# Patient Record
Sex: FEMALE | Race: WHITE | Employment: UNEMPLOYED | URBAN - METROPOLITAN AREA
[De-identification: names, ages, dates, MRNs, and addresses within clinical notes are randomized per-mention and may not be internally consistent; named-entity substitution may affect disease eponyms.]

---

## 2024-03-26 ENCOUNTER — APPOINTMENT (OUTPATIENT)
Dept: RADIOLOGY | Facility: HOSPITAL | Age: 74
End: 2024-03-26
Payer: COMMERCIAL

## 2024-03-26 ENCOUNTER — HOSPITAL ENCOUNTER (INPATIENT)
Facility: HOSPITAL | Age: 74
LOS: 1 days | Discharge: HOME | End: 2024-03-27
Attending: STUDENT IN AN ORGANIZED HEALTH CARE EDUCATION/TRAINING PROGRAM | Admitting: STUDENT IN AN ORGANIZED HEALTH CARE EDUCATION/TRAINING PROGRAM
Payer: COMMERCIAL

## 2024-03-26 DIAGNOSIS — R74.8 ELEVATED LIVER ENZYMES: Primary | ICD-10-CM

## 2024-03-26 DIAGNOSIS — K86.2 PANCREATIC CYST (HHS-HCC): ICD-10-CM

## 2024-03-26 LAB
AFP SERPL-MCNC: 5 NG/ML (ref 0–9)
ALBUMIN SERPL BCP-MCNC: 4 G/DL (ref 3.4–5)
ALP SERPL-CCNC: 172 U/L (ref 33–136)
ALT SERPL W P-5'-P-CCNC: 432 U/L (ref 7–45)
ANION GAP SERPL CALC-SCNC: 18 MMOL/L (ref 10–20)
AST SERPL W P-5'-P-CCNC: 132 U/L (ref 9–39)
BASOPHILS # BLD AUTO: 0.04 X10*3/UL (ref 0–0.1)
BASOPHILS NFR BLD AUTO: 0.7 %
BILIRUB DIRECT SERPL-MCNC: 0.3 MG/DL (ref 0–0.3)
BILIRUB SERPL-MCNC: 1 MG/DL (ref 0–1.2)
BUN SERPL-MCNC: 13 MG/DL (ref 6–23)
CALCIUM SERPL-MCNC: 9.5 MG/DL (ref 8.6–10.6)
CANCER AG19-9 SERPL-ACNC: 15.44 U/ML
CEA SERPL-MCNC: <0.5 UG/L
CERULOPLASMIN SERPL-MCNC: 31.5 MG/DL (ref 20–60)
CHLORIDE SERPL-SCNC: 107 MMOL/L (ref 98–107)
CO2 SERPL-SCNC: 20 MMOL/L (ref 21–32)
CREAT SERPL-MCNC: 0.59 MG/DL (ref 0.5–1.05)
EGFRCR SERPLBLD CKD-EPI 2021: >90 ML/MIN/1.73M*2
EOSINOPHIL # BLD AUTO: 0.09 X10*3/UL (ref 0–0.4)
EOSINOPHIL NFR BLD AUTO: 1.6 %
ERYTHROCYTE [DISTWIDTH] IN BLOOD BY AUTOMATED COUNT: 13.7 % (ref 11.5–14.5)
EST. AVERAGE GLUCOSE BLD GHB EST-MCNC: 103 MG/DL
GLUCOSE BLD MANUAL STRIP-MCNC: 79 MG/DL (ref 74–99)
GLUCOSE SERPL-MCNC: 60 MG/DL (ref 74–99)
HAV IGM SER QL: NONREACTIVE
HBA1C MFR BLD: 5.2 %
HBV CORE IGM SER QL: NONREACTIVE
HBV SURFACE AG SERPL QL IA: NONREACTIVE
HCT VFR BLD AUTO: 33.6 % (ref 36–46)
HCV AB SER QL: NONREACTIVE
HGB BLD-MCNC: 11 G/DL (ref 12–16)
IMM GRANULOCYTES # BLD AUTO: 0.02 X10*3/UL (ref 0–0.5)
IMM GRANULOCYTES NFR BLD AUTO: 0.4 % (ref 0–0.9)
LYMPHOCYTES # BLD AUTO: 0.76 X10*3/UL (ref 0.8–3)
LYMPHOCYTES NFR BLD AUTO: 13.4 %
MAGNESIUM SERPL-MCNC: 1.98 MG/DL (ref 1.6–2.4)
MCH RBC QN AUTO: 27.4 PG (ref 26–34)
MCHC RBC AUTO-ENTMCNC: 32.7 G/DL (ref 32–36)
MCV RBC AUTO: 84 FL (ref 80–100)
MONOCYTES # BLD AUTO: 0.75 X10*3/UL (ref 0.05–0.8)
MONOCYTES NFR BLD AUTO: 13.3 %
NEUTROPHILS # BLD AUTO: 4 X10*3/UL (ref 1.6–5.5)
NEUTROPHILS NFR BLD AUTO: 70.6 %
NRBC BLD-RTO: 0 /100 WBCS (ref 0–0)
PLATELET # BLD AUTO: 194 X10*3/UL (ref 150–450)
POTASSIUM SERPL-SCNC: 4.1 MMOL/L (ref 3.5–5.3)
PROT SERPL-MCNC: 6.5 G/DL (ref 6.4–8.2)
RBC # BLD AUTO: 4.01 X10*6/UL (ref 4–5.2)
SODIUM SERPL-SCNC: 141 MMOL/L (ref 136–145)
WBC # BLD AUTO: 5.7 X10*3/UL (ref 4.4–11.3)

## 2024-03-26 PROCEDURE — 80074 ACUTE HEPATITIS PANEL: CPT

## 2024-03-26 PROCEDURE — 86038 ANTINUCLEAR ANTIBODIES: CPT | Performed by: STUDENT IN AN ORGANIZED HEALTH CARE EDUCATION/TRAINING PROGRAM

## 2024-03-26 PROCEDURE — 2500000001 HC RX 250 WO HCPCS SELF ADMINISTERED DRUGS (ALT 637 FOR MEDICARE OP)

## 2024-03-26 PROCEDURE — 74183 MRI ABD W/O CNTR FLWD CNTR: CPT

## 2024-03-26 PROCEDURE — 82104 ALPHA-1-ANTITRYPSIN PHENO: CPT | Performed by: STUDENT IN AN ORGANIZED HEALTH CARE EDUCATION/TRAINING PROGRAM

## 2024-03-26 PROCEDURE — A9575 INJ GADOTERATE MEGLUMI 0.1ML: HCPCS | Performed by: STUDENT IN AN ORGANIZED HEALTH CARE EDUCATION/TRAINING PROGRAM

## 2024-03-26 PROCEDURE — 86381 MITOCHONDRIAL ANTIBODY EACH: CPT | Performed by: STUDENT IN AN ORGANIZED HEALTH CARE EDUCATION/TRAINING PROGRAM

## 2024-03-26 PROCEDURE — 86301 IMMUNOASSAY TUMOR CA 19-9: CPT | Performed by: STUDENT IN AN ORGANIZED HEALTH CARE EDUCATION/TRAINING PROGRAM

## 2024-03-26 PROCEDURE — 2550000001 HC RX 255 CONTRASTS: Performed by: STUDENT IN AN ORGANIZED HEALTH CARE EDUCATION/TRAINING PROGRAM

## 2024-03-26 PROCEDURE — 86015 ACTIN ANTIBODY EACH: CPT | Performed by: STUDENT IN AN ORGANIZED HEALTH CARE EDUCATION/TRAINING PROGRAM

## 2024-03-26 PROCEDURE — 36415 COLL VENOUS BLD VENIPUNCTURE: CPT

## 2024-03-26 PROCEDURE — 82378 CARCINOEMBRYONIC ANTIGEN: CPT | Performed by: STUDENT IN AN ORGANIZED HEALTH CARE EDUCATION/TRAINING PROGRAM

## 2024-03-26 PROCEDURE — 80053 COMPREHEN METABOLIC PANEL: CPT

## 2024-03-26 PROCEDURE — 74183 MRI ABD W/O CNTR FLWD CNTR: CPT | Performed by: RADIOLOGY

## 2024-03-26 PROCEDURE — 82248 BILIRUBIN DIRECT: CPT

## 2024-03-26 PROCEDURE — 76376 3D RENDER W/INTRP POSTPROCES: CPT | Performed by: RADIOLOGY

## 2024-03-26 PROCEDURE — 83036 HEMOGLOBIN GLYCOSYLATED A1C: CPT

## 2024-03-26 PROCEDURE — 85025 COMPLETE CBC W/AUTO DIFF WBC: CPT

## 2024-03-26 PROCEDURE — 82947 ASSAY GLUCOSE BLOOD QUANT: CPT

## 2024-03-26 PROCEDURE — 82390 ASSAY OF CERULOPLASMIN: CPT | Performed by: STUDENT IN AN ORGANIZED HEALTH CARE EDUCATION/TRAINING PROGRAM

## 2024-03-26 PROCEDURE — 83735 ASSAY OF MAGNESIUM: CPT

## 2024-03-26 PROCEDURE — 1210000001 HC SEMI-PRIVATE ROOM DAILY

## 2024-03-26 PROCEDURE — 82105 ALPHA-FETOPROTEIN SERUM: CPT | Performed by: STUDENT IN AN ORGANIZED HEALTH CARE EDUCATION/TRAINING PROGRAM

## 2024-03-26 PROCEDURE — 99222 1ST HOSP IP/OBS MODERATE 55: CPT

## 2024-03-26 RX ORDER — METHYLPHENIDATE HYDROCHLORIDE 5 MG/1
20 TABLET ORAL 3 TIMES DAILY
Status: CANCELLED | OUTPATIENT
Start: 2024-03-26

## 2024-03-26 RX ORDER — DEXTROSE 50 % IN WATER (D50W) INTRAVENOUS SYRINGE
25
Status: DISCONTINUED | OUTPATIENT
Start: 2024-03-26 | End: 2024-03-27 | Stop reason: HOSPADM

## 2024-03-26 RX ORDER — GADOTERATE MEGLUMINE 376.9 MG/ML
11 INJECTION INTRAVENOUS
Status: COMPLETED | OUTPATIENT
Start: 2024-03-26 | End: 2024-03-26

## 2024-03-26 RX ORDER — FAMOTIDINE 20 MG/1
20 TABLET, FILM COATED ORAL DAILY
Status: DISCONTINUED | OUTPATIENT
Start: 2024-03-26 | End: 2024-03-27 | Stop reason: HOSPADM

## 2024-03-26 RX ORDER — METHYLPHENIDATE HYDROCHLORIDE 5 MG/1
20 TABLET ORAL 2 TIMES DAILY
Status: DISCONTINUED | OUTPATIENT
Start: 2024-03-26 | End: 2024-03-26

## 2024-03-26 RX ORDER — HYDROCODONE BITARTRATE AND ACETAMINOPHEN 7.5; 325 MG/1; MG/1
1 TABLET ORAL 4 TIMES DAILY
COMMUNITY

## 2024-03-26 RX ORDER — LORATADINE 10 MG/1
10 TABLET ORAL DAILY
COMMUNITY

## 2024-03-26 RX ORDER — LISINOPRIL 10 MG/1
10 TABLET ORAL DAILY
Status: DISCONTINUED | OUTPATIENT
Start: 2024-03-26 | End: 2024-03-27 | Stop reason: HOSPADM

## 2024-03-26 RX ORDER — OXYCODONE HYDROCHLORIDE 5 MG/1
5 TABLET ORAL EVERY 6 HOURS PRN
Status: CANCELLED | OUTPATIENT
Start: 2024-03-26

## 2024-03-26 RX ORDER — METAXALONE 800 MG/1
800 TABLET ORAL 3 TIMES DAILY PRN
COMMUNITY

## 2024-03-26 RX ORDER — OXYCODONE HYDROCHLORIDE 5 MG/1
5 TABLET ORAL EVERY 6 HOURS PRN
Status: DISCONTINUED | OUTPATIENT
Start: 2024-03-26 | End: 2024-03-27 | Stop reason: HOSPADM

## 2024-03-26 RX ORDER — GLUCOSAMINE/CHONDRO SU A 500-400 MG
1 TABLET ORAL DAILY
COMMUNITY

## 2024-03-26 RX ORDER — ACETAMINOPHEN 325 MG/1
650 TABLET ORAL EVERY 4 HOURS PRN
Status: DISCONTINUED | OUTPATIENT
Start: 2024-03-26 | End: 2024-03-27 | Stop reason: HOSPADM

## 2024-03-26 RX ORDER — ATORVASTATIN CALCIUM 20 MG/1
20 TABLET, FILM COATED ORAL NIGHTLY
Status: CANCELLED | OUTPATIENT
Start: 2024-03-26

## 2024-03-26 RX ORDER — ACETAMINOPHEN 650 MG/1
650 SUPPOSITORY RECTAL EVERY 4 HOURS PRN
Status: DISCONTINUED | OUTPATIENT
Start: 2024-03-26 | End: 2024-03-27 | Stop reason: HOSPADM

## 2024-03-26 RX ORDER — METHYLPHENIDATE HYDROCHLORIDE 5 MG/1
20 TABLET ORAL
Status: DISCONTINUED | OUTPATIENT
Start: 2024-03-26 | End: 2024-03-26

## 2024-03-26 RX ORDER — LORATADINE 10 MG/1
10 TABLET ORAL DAILY
Status: DISCONTINUED | OUTPATIENT
Start: 2024-03-26 | End: 2024-03-27 | Stop reason: HOSPADM

## 2024-03-26 RX ORDER — LISINOPRIL 10 MG/1
10 TABLET ORAL DAILY
Status: CANCELLED | OUTPATIENT
Start: 2024-03-26

## 2024-03-26 RX ORDER — ACETAMINOPHEN 160 MG/5ML
650 SOLUTION ORAL EVERY 4 HOURS PRN
Status: DISCONTINUED | OUTPATIENT
Start: 2024-03-26 | End: 2024-03-27 | Stop reason: HOSPADM

## 2024-03-26 RX ORDER — METAXALONE 800 MG/1
800 TABLET ORAL 3 TIMES DAILY PRN
Status: DISCONTINUED | OUTPATIENT
Start: 2024-03-26 | End: 2024-03-27 | Stop reason: HOSPADM

## 2024-03-26 RX ORDER — SIMETHICONE 180 MG
180 CAPSULE ORAL EVERY 6 HOURS PRN
COMMUNITY

## 2024-03-26 RX ORDER — SENNOSIDES 8.6 MG/1
2 TABLET ORAL 2 TIMES DAILY
Status: DISCONTINUED | OUTPATIENT
Start: 2024-03-26 | End: 2024-03-27 | Stop reason: HOSPADM

## 2024-03-26 RX ORDER — FAMOTIDINE 20 MG/1
20 TABLET, FILM COATED ORAL DAILY
COMMUNITY

## 2024-03-26 RX ORDER — METHYLPHENIDATE HYDROCHLORIDE 20 MG/1
20 TABLET ORAL
COMMUNITY

## 2024-03-26 RX ORDER — DEXTROSE 50 % IN WATER (D50W) INTRAVENOUS SYRINGE
12.5
Status: DISCONTINUED | OUTPATIENT
Start: 2024-03-26 | End: 2024-03-27 | Stop reason: HOSPADM

## 2024-03-26 RX ORDER — LISINOPRIL 10 MG/1
10 TABLET ORAL DAILY
COMMUNITY

## 2024-03-26 RX ORDER — ZOLPIDEM TARTRATE 5 MG/1
5 TABLET ORAL NIGHTLY PRN
COMMUNITY

## 2024-03-26 RX ORDER — METHYLPHENIDATE HYDROCHLORIDE 5 MG/1
20 TABLET ORAL 2 TIMES DAILY
Status: DISCONTINUED | OUTPATIENT
Start: 2024-03-27 | End: 2024-03-27 | Stop reason: HOSPADM

## 2024-03-26 RX ORDER — SIMETHICONE 80 MG
40 TABLET,CHEWABLE ORAL 4 TIMES DAILY PRN
Status: DISCONTINUED | OUTPATIENT
Start: 2024-03-26 | End: 2024-03-27 | Stop reason: HOSPADM

## 2024-03-26 RX ORDER — ZOLPIDEM TARTRATE 5 MG/1
5 TABLET ORAL NIGHTLY PRN
Status: DISCONTINUED | OUTPATIENT
Start: 2024-03-26 | End: 2024-03-27 | Stop reason: HOSPADM

## 2024-03-26 RX ORDER — ZOLPIDEM TARTRATE 5 MG/1
5 TABLET ORAL NIGHTLY PRN
Status: CANCELLED | OUTPATIENT
Start: 2024-03-26

## 2024-03-26 RX ADMIN — OXYCODONE HYDROCHLORIDE 5 MG: 5 TABLET ORAL at 21:17

## 2024-03-26 RX ADMIN — OXYCODONE HYDROCHLORIDE 5 MG: 5 TABLET ORAL at 14:07

## 2024-03-26 RX ADMIN — SIMETHICONE 40 MG: 80 TABLET, CHEWABLE ORAL at 21:17

## 2024-03-26 RX ADMIN — SENNOSIDES 17.2 MG: 8.6 TABLET, FILM COATED ORAL at 11:53

## 2024-03-26 RX ADMIN — GADOTERATE MEGLUMINE 11 ML: 376.9 INJECTION INTRAVENOUS at 17:42

## 2024-03-26 SDOH — SOCIAL STABILITY: SOCIAL INSECURITY: DO YOU FEEL UNSAFE GOING BACK TO THE PLACE WHERE YOU ARE LIVING?: NO

## 2024-03-26 SDOH — SOCIAL STABILITY: SOCIAL INSECURITY: WERE YOU ABLE TO COMPLETE ALL THE BEHAVIORAL HEALTH SCREENINGS?: YES

## 2024-03-26 SDOH — SOCIAL STABILITY: SOCIAL INSECURITY: HAVE YOU HAD THOUGHTS OF HARMING ANYONE ELSE?: NO

## 2024-03-26 SDOH — SOCIAL STABILITY: SOCIAL INSECURITY: DO YOU FEEL ANYONE HAS EXPLOITED OR TAKEN ADVANTAGE OF YOU FINANCIALLY OR OF YOUR PERSONAL PROPERTY?: NO

## 2024-03-26 SDOH — SOCIAL STABILITY: SOCIAL INSECURITY: ARE THERE ANY APPARENT SIGNS OF INJURIES/BEHAVIORS THAT COULD BE RELATED TO ABUSE/NEGLECT?: NO

## 2024-03-26 SDOH — SOCIAL STABILITY: SOCIAL INSECURITY: HAS ANYONE EVER THREATENED TO HURT YOUR FAMILY OR YOUR PETS?: NO

## 2024-03-26 SDOH — SOCIAL STABILITY: SOCIAL INSECURITY: DOES ANYONE TRY TO KEEP YOU FROM HAVING/CONTACTING OTHER FRIENDS OR DOING THINGS OUTSIDE YOUR HOME?: NO

## 2024-03-26 SDOH — SOCIAL STABILITY: SOCIAL INSECURITY: ARE YOU OR HAVE YOU BEEN THREATENED OR ABUSED PHYSICALLY, EMOTIONALLY, OR SEXUALLY BY ANYONE?: NO

## 2024-03-26 SDOH — SOCIAL STABILITY: SOCIAL INSECURITY: ABUSE: ADULT

## 2024-03-26 ASSESSMENT — ACTIVITIES OF DAILY LIVING (ADL)
FEEDING YOURSELF: INDEPENDENT
BATHING: INDEPENDENT
PATIENT'S MEMORY ADEQUATE TO SAFELY COMPLETE DAILY ACTIVITIES?: YES
LACK_OF_TRANSPORTATION: NO
TOILETING: INDEPENDENT
JUDGMENT_ADEQUATE_SAFELY_COMPLETE_DAILY_ACTIVITIES: YES
HEARING - RIGHT EAR: FUNCTIONAL
ADEQUATE_TO_COMPLETE_ADL: YES
GROOMING: INDEPENDENT
HEARING - LEFT EAR: FUNCTIONAL
WALKS IN HOME: INDEPENDENT
DRESSING YOURSELF: INDEPENDENT

## 2024-03-26 ASSESSMENT — COGNITIVE AND FUNCTIONAL STATUS - GENERAL
PATIENT BASELINE BEDBOUND: NO
WALKING IN HOSPITAL ROOM: A LITTLE
MOBILITY SCORE: 22
DAILY ACTIVITIY SCORE: 24
CLIMB 3 TO 5 STEPS WITH RAILING: A LITTLE

## 2024-03-26 ASSESSMENT — ENCOUNTER SYMPTOMS
NAUSEA: 1
EYES NEGATIVE: 1
DIARRHEA: 0
APPETITE CHANGE: 0
MUSCULOSKELETAL NEGATIVE: 1
BLOOD IN STOOL: 0
CARDIOVASCULAR NEGATIVE: 1
RESPIRATORY NEGATIVE: 1
PSYCHIATRIC NEGATIVE: 1
ALLERGIC/IMMUNOLOGIC NEGATIVE: 1
CHILLS: 0
HEMATOLOGIC/LYMPHATIC NEGATIVE: 1
FATIGUE: 1
ENDOCRINE NEGATIVE: 1
VOMITING: 1
WEAKNESS: 1
DIAPHORESIS: 1
UNEXPECTED WEIGHT CHANGE: 0

## 2024-03-26 ASSESSMENT — PAIN SCALES - GENERAL
PAINLEVEL_OUTOF10: 7
PAINLEVEL_OUTOF10: 7
PAINLEVEL_OUTOF10: 4
PAINLEVEL_OUTOF10: 5 - MODERATE PAIN
PAINLEVEL_OUTOF10: 0 - NO PAIN
PAINLEVEL_OUTOF10: 7

## 2024-03-26 ASSESSMENT — LIFESTYLE VARIABLES
HOW MANY STANDARD DRINKS CONTAINING ALCOHOL DO YOU HAVE ON A TYPICAL DAY: PATIENT DOES NOT DRINK
AUDIT-C TOTAL SCORE: 0
SKIP TO QUESTIONS 9-10: 1
AUDIT-C TOTAL SCORE: 0
HOW OFTEN DO YOU HAVE A DRINK CONTAINING ALCOHOL: NEVER
HOW OFTEN DO YOU HAVE 6 OR MORE DRINKS ON ONE OCCASION: NEVER

## 2024-03-26 ASSESSMENT — PAIN DESCRIPTION - DESCRIPTORS
DESCRIPTORS: ACHING;DISCOMFORT

## 2024-03-26 ASSESSMENT — PAIN - FUNCTIONAL ASSESSMENT
PAIN_FUNCTIONAL_ASSESSMENT: 0-10

## 2024-03-26 ASSESSMENT — PATIENT HEALTH QUESTIONNAIRE - PHQ9
1. LITTLE INTEREST OR PLEASURE IN DOING THINGS: NOT AT ALL
2. FEELING DOWN, DEPRESSED OR HOPELESS: NOT AT ALL
SUM OF ALL RESPONSES TO PHQ9 QUESTIONS 1 & 2: 0

## 2024-03-26 ASSESSMENT — COLUMBIA-SUICIDE SEVERITY RATING SCALE - C-SSRS
1. IN THE PAST MONTH, HAVE YOU WISHED YOU WERE DEAD OR WISHED YOU COULD GO TO SLEEP AND NOT WAKE UP?: NO
6. HAVE YOU EVER DONE ANYTHING, STARTED TO DO ANYTHING, OR PREPARED TO DO ANYTHING TO END YOUR LIFE?: NO
2. HAVE YOU ACTUALLY HAD ANY THOUGHTS OF KILLING YOURSELF?: NO

## 2024-03-26 NOTE — H&P
"History Of Present Illness  Kallie Watkins is a 74 y.o. female w PMHx notable for narcolepsy, fibromyalgia, arthritis, mitral valve prolapse, HTN, presenting as a transfer from [facility] for concern for pancreatic cancer.     Patient reports she has not been feeling well for 1.5 years, primarily feeling \"crappy\" and weak. For the past 6 months, she has been having night sweats and n/v about once a month. She described her nausea as clear lavendar, maybe from pepcid. She also noticed her urine looking like beer. On Sunday, she felt really bad with sweats abdominal pain and nausea and went to the ED. She denies any weight loss, appetite changes, shortness of breath, chest pain, changes in bowel pattern.     Patient reports at [facility] [place], she was told her liver enzymes were \"angel high\" and there was concern for cancer. She was supposed to get MRI but she was bumped from the schedule for another patient. She said she was transferred here to  to see the pancreatic cancer specialist. At [place], 3/24 labs notable for AST >2000, ALT 1700s, INR 0.9. CT AP showing dilated intrahepatic biliary duct and dilated pancreatic duct.  Patient received 1L NS, IV morphine 4mg, zofran    PMHx as mentioned in HPI   Social hx:   Tobacco: smoked cigarettes [age]yr old, and [id number]   ETOH: drank when [age]yrs old, no alcohol use since then   Drug: never IVDU, did use marijuana [age] yrs old, no current drug use  Worked in mental health     Past Medical History  Past Medical History:   Diagnosis Date    Arthritis        Surgical History  History reviewed. No pertinent surgical history.     Social History  She reports that she has never smoked. She has never used smokeless tobacco. She reports that she does not drink alcohol and does not use drugs.    Family History  No family history on file.     Allergies  Patient has no known allergies.    Review of Systems   Constitutional:  Positive for diaphoresis and fatigue. Negative for appetite change, " chills and unexpected weight change.   HENT: Negative.     Eyes: Negative.    Respiratory: Negative.     Cardiovascular: Negative.    Gastrointestinal:  Positive for nausea and vomiting. Negative for blood in stool and diarrhea.   Endocrine: Negative.    Genitourinary: Negative.    Musculoskeletal: Negative.    Skin: Negative.    Allergic/Immunologic: Negative.    Neurological:  Positive for weakness.   Hematological: Negative.    Psychiatric/Behavioral: Negative.            Physical Exam  Constitutional:       General: She is not in acute distress.     Appearance: Normal appearance.   HENT:      Head: Normocephalic and atraumatic.   Eyes:      General: No scleral icterus.     Extraocular Movements: Extraocular movements intact.   Cardiovascular:      Rate and Rhythm: Normal rate.   Pulmonary:      Effort: Pulmonary effort is normal.   Abdominal:      General: Abdomen is flat. There is no distension.      Palpations: Abdomen is soft.   Musculoskeletal:         General: Normal range of motion.      Right lower leg: No edema.      Left lower leg: No edema.   Skin:     General: Skin is warm and dry.      Coloration: Skin is not jaundiced.   Neurological:      General: No focal deficit present.      Mental Status: She is alert and oriented to person, place, and time.      Cranial Nerves: No cranial nerve deficit.   Psychiatric:         Mood and Affect: Mood normal.            Last Recorded Vitals  Blood pressure 123/62, pulse 75, temperature 36.7 °C (98.1 °F), temperature source Tympanic, resp. rate 16, SpO2 98 %.    Relevant Results    CBC: WBC 9.5, Hgb 12.4, plt 209    BMP: Na 133, K 4.0, Cl 97, HCO3 28, BUN 14, Cr 0.81, glu 123    LFT: Ca 9.7, tprot 6.8, alb 4.4, alkphos 278, AST >2000, ALT 1762, tbili 2.0    Electrolytes:  Mg 2.0  Heme: PT 11.3, INR 0.9    hs-TnI <4  Lipase 22    Acetaminophen level 0.9 (10-30)    UA small bili; ketones 40; protein 100; nitrite negative; LE negative; RBC 0-2; WBC 0-2; Squamous Epi  0-3; Mucus 3+; amorphous 1+' bacteria 3+; corase granular casts 3-5     CT A/P w/IV contrast   Findings:   The liver is unremarkable in coutour. No suspicious hepatic lesions. Mild intrahepatic biliary duct dilation. Mildly dilated common bile duct 1.0cm. The spleen and adrenal glands are unremarkable. Incidentally noted splenule. Mildly dilated pancreatic duct, 4mm. Gallbladder is mildly distended. The kidneys enhance symmetrically without evidence of hydronephrosis or suspicious lesions. Nonobstructive left nephrolithiasis. Mild circumferential prominence of the urinary bladder wall in the setting of slight under distension. No suspicious uterine lesions. Calcification within the uterus may be sequela of fibroid disease. Cystic lesion within the posterior right hemipelvus up to 4.3cm and containing simple fluid is favored tgo represent a right adnexal cyst. Unremarkable appearance of the stomach within the setting of under distension. No dilated loops of small bowel. No small bowel wall thickening. Some mild thickening of the sigmoid and descending colonic wall in the setting of under distension. No dilated colonic loops or pericolonic infiltrative stranding. No pathologically enlarged or aggressive appearing lymph nodes. Nonaneurysmal aortoiliac arteries. No acute osseous abnormality. Partially imaged well-defined lucent lesion within the proximal R. femur; no aggressive features. Stable hyperdense focus within the posteroinferior T12 vertebral body; this is favored to represent a bone island. Grade 1-2 anterolisthesis of L5 and S1 and grade 1 anterolisthesis of L4on L5. Exaggerated lumbar lordosis. Varying degrees of widespread degenerative change. No acute abnormality within the partially visualized lower thorax   Impression: Mild intrahepatic and common biliary duct dilation. Correlation with alkaline phosphatase. Also, the pancreatic duct is dilated to 4mm. in conjunction, these findings raise the possibility  of underlying pancreatic adenocarcinoma. Further evaluation with MRCP and MRI pancreas is recommended on nonemergent basis. Circumfrential prominence of the urinary bladder wall. This may be related to under distension though correlate with findings of colitis. Partially imaged well-defined lucent lesion within the proximal right femur. No aggressive features are seen. Attention on follow-up is recommended. 4.3 cm right adnexal cyst. Recommend follow-up pelvic ultrasound in 6-12 months. Reference JACR 2020 Feb; 17(2) : 248-245      Assessment/Plan   Principal Problem:    Elevated liver enzymes      Ms. Watkins is a 74yoF w PMHx notable for fibromyalgia, HTN, arthritis, HTN, narcolepsy presenting with 1.5 years of fatigue, 6 months of night sweats and n/v, labs notable for significant transaminitis, and CT AP w double duct sign, concerning for pancreatic cancer. Ddx includes hepatotoxicity from pts chronic tylenol use, but tylenol level 0.9 and pt takes 2800mg MAX of acetaminophen, and would not explain imaging findings, vs DILI vs gallstone vs other hepatic pathology. Will plan for MRCP and send off acute hepatitis panel as well as tumor markers CEA, , SHELBIE, anti sm, anti mitochondrial ab, alpha1 antitrypsin. Will consult GI vs hepatology.     #transaminitis  #c/f pancreatic malignancy   :: AST >2000, ALT 1762, alk phos 278  :: CT AP with double duct sign  :: takes excedrin max 2x a day, max 2 500mg tylenol a day, and norco 325-7.5 qid (2800mg acteaminophen/day)  -acute hepatitis panel   -MRCP pancreas   -NPO   -repeat LFTs  -consult gi vs hepatology     #narcolepsy   -cw home ritalin 20 tid     #fibromyalgia   - hold homd norco 325-7.5 qid   - cw home metaxalone 800 tid prn  - tylenol 650 qid prn for pain  - oxy 5 q6h prn for severe/breakthrough pain    #htn  -cw home lisinopril 10     #hld   -cw home atorvastatin 20     #insomnia  -zolpidem 5 at bedtime    #GERD  -pepcid 20 daily    #constipation   -senna prn      F: prn   E: mg>2 k > 4 prn   N: NPO   GI ppx: not indicated  DVT ppx: SCDs    FULL CODE   NOK: Arya Maevegautam (brother) [phone]             Kelly Gould MD

## 2024-03-27 VITALS
DIASTOLIC BLOOD PRESSURE: 80 MMHG | RESPIRATION RATE: 16 BRPM | TEMPERATURE: 98.8 F | OXYGEN SATURATION: 95 % | SYSTOLIC BLOOD PRESSURE: 141 MMHG | HEART RATE: 71 BPM

## 2024-03-27 LAB
ALBUMIN SERPL BCP-MCNC: 4.2 G/DL (ref 3.4–5)
ALP SERPL-CCNC: 186 U/L (ref 33–136)
ALT SERPL W P-5'-P-CCNC: 309 U/L (ref 7–45)
ANA SER QL HEP2 SUBST: NEGATIVE
ANION GAP SERPL CALC-SCNC: 15 MMOL/L (ref 10–20)
AST SERPL W P-5'-P-CCNC: 54 U/L (ref 9–39)
BILIRUB SERPL-MCNC: 0.6 MG/DL (ref 0–1.2)
BUN SERPL-MCNC: 16 MG/DL (ref 6–23)
CALCIUM SERPL-MCNC: 10 MG/DL (ref 8.6–10.6)
CHLORIDE SERPL-SCNC: 103 MMOL/L (ref 98–107)
CO2 SERPL-SCNC: 26 MMOL/L (ref 21–32)
CREAT SERPL-MCNC: 0.89 MG/DL (ref 0.5–1.05)
EGFRCR SERPLBLD CKD-EPI 2021: 69 ML/MIN/1.73M*2
ERYTHROCYTE [DISTWIDTH] IN BLOOD BY AUTOMATED COUNT: 13.7 % (ref 11.5–14.5)
GLUCOSE SERPL-MCNC: 108 MG/DL (ref 74–99)
HCT VFR BLD AUTO: 36.7 % (ref 36–46)
HGB BLD-MCNC: 12.2 G/DL (ref 12–16)
MCH RBC QN AUTO: 27.3 PG (ref 26–34)
MCHC RBC AUTO-ENTMCNC: 33.2 G/DL (ref 32–36)
MCV RBC AUTO: 82 FL (ref 80–100)
MITOCHONDRIA AB SER QL IF: NEGATIVE
NRBC BLD-RTO: 0 /100 WBCS (ref 0–0)
PHOSPHATE SERPL-MCNC: 2.9 MG/DL (ref 2.5–4.9)
PLATELET # BLD AUTO: 215 X10*3/UL (ref 150–450)
POTASSIUM SERPL-SCNC: 4.1 MMOL/L (ref 3.5–5.3)
PROT SERPL-MCNC: 6.9 G/DL (ref 6.4–8.2)
RBC # BLD AUTO: 4.47 X10*6/UL (ref 4–5.2)
SMOOTH MUSCLE AB SER QL IF: NEGATIVE
SODIUM SERPL-SCNC: 140 MMOL/L (ref 136–145)
WBC # BLD AUTO: 6.5 X10*3/UL (ref 4.4–11.3)

## 2024-03-27 PROCEDURE — 2500000001 HC RX 250 WO HCPCS SELF ADMINISTERED DRUGS (ALT 637 FOR MEDICARE OP)

## 2024-03-27 PROCEDURE — 84100 ASSAY OF PHOSPHORUS: CPT

## 2024-03-27 PROCEDURE — 2500000004 HC RX 250 GENERAL PHARMACY W/ HCPCS (ALT 636 FOR OP/ED)

## 2024-03-27 PROCEDURE — 97161 PT EVAL LOW COMPLEX 20 MIN: CPT | Mod: GP

## 2024-03-27 PROCEDURE — 99238 HOSP IP/OBS DSCHRG MGMT 30/<: CPT

## 2024-03-27 PROCEDURE — 36415 COLL VENOUS BLD VENIPUNCTURE: CPT

## 2024-03-27 PROCEDURE — 85027 COMPLETE CBC AUTOMATED: CPT

## 2024-03-27 PROCEDURE — 99222 1ST HOSP IP/OBS MODERATE 55: CPT | Performed by: STUDENT IN AN ORGANIZED HEALTH CARE EDUCATION/TRAINING PROGRAM

## 2024-03-27 PROCEDURE — 80053 COMPREHEN METABOLIC PANEL: CPT

## 2024-03-27 PROCEDURE — 2500000002 HC RX 250 W HCPCS SELF ADMINISTERED DRUGS (ALT 637 FOR MEDICARE OP, ALT 636 FOR OP/ED)

## 2024-03-27 RX ORDER — HEPARIN SODIUM 5000 [USP'U]/ML
5000 INJECTION, SOLUTION INTRAVENOUS; SUBCUTANEOUS EVERY 8 HOURS SCHEDULED
Status: DISCONTINUED | OUTPATIENT
Start: 2024-03-27 | End: 2024-03-27 | Stop reason: HOSPADM

## 2024-03-27 RX ADMIN — FAMOTIDINE 20 MG: 20 TABLET, FILM COATED ORAL at 09:16

## 2024-03-27 RX ADMIN — METHYLPHENIDATE HYDROCHLORIDE 20 MG: 5 TABLET ORAL at 07:31

## 2024-03-27 RX ADMIN — HEPARIN SODIUM 5000 UNITS: 5000 INJECTION INTRAVENOUS; SUBCUTANEOUS at 09:22

## 2024-03-27 RX ADMIN — OXYCODONE HYDROCHLORIDE 5 MG: 5 TABLET ORAL at 09:16

## 2024-03-27 RX ADMIN — LORATADINE 10 MG: 10 TABLET ORAL at 09:16

## 2024-03-27 RX ADMIN — ZOLPIDEM TARTRATE 5 MG: 5 TABLET ORAL at 00:48

## 2024-03-27 ASSESSMENT — COGNITIVE AND FUNCTIONAL STATUS - GENERAL
CLIMB 3 TO 5 STEPS WITH RAILING: A LITTLE
WALKING IN HOSPITAL ROOM: A LITTLE
MOBILITY SCORE: 22
CLIMB 3 TO 5 STEPS WITH RAILING: A LITTLE
MOBILITY SCORE: 23
DAILY ACTIVITIY SCORE: 24

## 2024-03-27 ASSESSMENT — PAIN - FUNCTIONAL ASSESSMENT: PAIN_FUNCTIONAL_ASSESSMENT: 0-10

## 2024-03-27 ASSESSMENT — ACTIVITIES OF DAILY LIVING (ADL)
LACK_OF_TRANSPORTATION: NO
ADL_ASSISTANCE: INDEPENDENT

## 2024-03-27 ASSESSMENT — PAIN DESCRIPTION - LOCATION: LOCATION: GENERALIZED

## 2024-03-27 ASSESSMENT — PAIN SCALES - GENERAL: PAINLEVEL_OUTOF10: 7

## 2024-03-27 NOTE — CONSULTS
[facility]   [facility]  INITIAL CONSULT NOTE       Reason For Consult: new finding of lesion in the pancreatic head    SUBJECTIVE     History Of Present Illness  Kallie Watkins is a 74 y.o. female with a past medical history of narcolepsy and fibromyalgia admitted on 3/26/2024 with elevated transaminases and imaging findings concerning of a small lesion in the pancreatic head for which GI is consulted.    Patient states that she has been feeling generally unwell for the past year. She endorses chronic fatigue and night sweats. She feels tired from performing her daily routine. She states that she often goes days without eating and then eats too much which results in abdominal pain and bloating, with occasional nausea and vomiting. These symptoms are usually alleviated with pepcid. She otherwise denies weight loss, GI bleeding, change in bowel habits. She was in this usual state of health until  morning when she woke up with severe abdominal pain, nausea/ vomiting and diarrhea. She states that she ate food from a local [employer] on Friday night and Saturday.     She presented to [facility] and was noted to have AST and ALT of [id number]. Tbili was 2 and ALK was 278. CT imaging notable for a small lesion in the head of the pancreas with CBD dilation and borderline dilation of the pancreatic duct to 4 mm. Patient was never jaundice. She denies dark urine and pale stools. She denies itching, RUQ pain.    She states that her father  of pancreatic cancer in his 50s.     Review of Systems: negative except as per HPI     Past Medical History:    Past Medical History:   Diagnosis Date    Arthritis        Home Medications  Medications Prior to Admission   Medication Sig Dispense Refill Last Dose    aspirin-acetaminophen-caffeine (Excedrin Migraine) 250-250-65 mg tablet Take 1 tablet by mouth 2 times a day as needed for headaches.   Past Week    famotidine (Pepcid) 20  mg tablet Take 1 tablet (20 mg) by mouth once daily.   Past Week    glucosamine-chondroitin 500-400 mg tablet Take 1 tablet by mouth early in the morning..   Past Week    HYDROcodone-acetaminophen (Norco) 7.5-325 mg tablet Take 1 tablet by mouth 4 times a day.   Past Week    lisinopril 10 mg tablet Take 1 tablet (10 mg) by mouth once daily.   Past Week    loratadine (Claritin) 10 mg tablet Take 1 tablet (10 mg) by mouth once daily.   Past Week    metaxalone (Skelaxin) 800 mg tablet Take 1 tablet (800 mg) by mouth 3 times a day as needed for muscle spasms.   Past Week    methylphenidate (Ritalin) 20 mg tablet Take 1 tablet (20 mg) by mouth 3 times a day before meals.   Past Week    simethicone (Gas Relief Ultra Strength) 180 mg capsule Take 1 capsule (180 mg) by mouth every 6 hours if needed for flatulence.   Past Week    zolpidem (Ambien) 5 mg tablet Take 1 tablet (5 mg) by mouth as needed at bedtime for sleep.   Past Week         Surgical History:  History reviewed. No pertinent surgical history.    Allergies:  No Known Allergies    Social History:    Social History     Socioeconomic History    Marital status: Single     Spouse name: Not on file    Number of children: Not on file    Years of education: Not on file    Highest education level: Not on file   Occupational History    Not on file   Tobacco Use    Smoking status: Never    Smokeless tobacco: Never   Substance and Sexual Activity    Alcohol use: Never    Drug use: Never    Sexual activity: Not on file   Other Topics Concern    Not on file   Social History Narrative    Not on file     Social Determinants of Health     Financial Resource Strain: Low Risk  (3/27/2024)    Overall Financial Resource Strain (CARDIA)     Difficulty of Paying Living Expenses: Not hard at all   Food Insecurity: Not on file   Transportation Needs: No Transportation Needs (3/27/2024)    PRAPARE - Transportation     Lack of Transportation (Medical): No     Lack of Transportation  (Non-Medical): No   Physical Activity: Not on file   Stress: Not on file   Social Connections: Not on file   Intimate Partner Violence: Not on file   Housing Stability: Low Risk  (3/27/2024)    Housing Stability Vital Sign     Unable to Pay for Housing in the Last Year: No     Number of Places Lived in the Last Year: 1     Unstable Housing in the Last Year: No       Family History:  No family history on file.    EXAM     Vitals:    Vitals:    03/26/24 1256 03/26/24 1952 03/27/24 0327 03/27/24 0904   BP: 128/70 115/62 111/61 141/80   Pulse: 70 81 69 71   Resp: 20 16 16 16   Temp: 37.3 °C (99.1 °F) 37.1 °C (98.8 °F) 37.2 °C (99 °F) 37.1 °C (98.8 °F)   TempSrc:       SpO2: 95% 98% 94% 95%     Failed to redirect to the Timeline version of the [employer] SmartLink.  No intake or output data in the 24 hours ending 03/27/24 1207      Physical Exam  General: well-nourished, no acute distress  HEENT: EOM intact, no scleral icterus, moist MM  Respiratory: nonlabored breathing on room air  Cardiovascular: RRR, no murmurs/rubs/gallops  Abdomen: Soft, nontender, nondistended, bowel sounds present. No masses palpated  Extremities: no edema, no asterixis  Neuro: alert and oriented, CNII-XII grossly intact, moves all 4 extremities with no focal deficits  Psych: calm and cooperative    OBJECTIVE                                                                              Medications       Current Facility-Administered Medications:     [Held by provider] acetaminophen (Tylenol) tablet 650 mg, 650 mg, oral, q4h PRN **OR** [Held by provider] acetaminophen (Tylenol) oral liquid 650 mg, 650 mg, oral, q4h PRN **OR** [Held by provider] acetaminophen (Tylenol) suppository 650 mg, 650 mg, rectal, q4h PRN, Kelly Gould MD    dextrose 50 % injection 12.5 g, 12.5 g, intravenous, q15 min PRN, Kelly Gould MD    dextrose 50 % injection 25 g, 25 g, intravenous, q15 min PRN, Kelly Gould MD    famotidine (Pepcid) tablet 20 mg, 20 mg, oral, Daily,  Kelly Gould MD, 20 mg at 03/27/24 0916    glucagon (Glucagen) injection 1 mg, 1 mg, intramuscular, q15 min PRN, Kelly Gould MD    glucagon (Glucagen) injection 1 mg, 1 mg, intramuscular, q15 min PRN, Kelly Gould MD    heparin (porcine) injection 5,000 Units, 5,000 Units, subcutaneous, q8h LILY, Kelly Gould MD, 5,000 Units at 03/27/24 0922    lisinopril tablet 10 mg, 10 mg, oral, Daily, Kelly Gould MD    loratadine (Claritin) tablet 10 mg, 10 mg, oral, Daily, Kelly Gould MD, 10 mg at 03/27/24 0916    metaxalone (Skelaxin) tablet 800 mg, 800 mg, oral, TID PRN, Kelly Gould MD    methylphenidate (Ritalin) tablet 20 mg, 20 mg, oral, BID, Kelly Gould MD, 20 mg at 03/27/24 0731    oxyCODONE (Roxicodone) immediate release tablet 5 mg, 5 mg, oral, q6h PRN, Kelly Gould MD, 5 mg at 03/27/24 0916    sennosides (Senokot) tablet 17.2 mg, 2 tablet, oral, BID, Kelly Gould MD, 17.2 mg at 03/26/24 1153    simethicone (Mylicon) chewable tablet 40 mg, 40 mg, oral, 4x daily PRN, Kelly Gould MD, 40 mg at 03/26/24 2117    zolpidem (Ambien) tablet 5 mg, 5 mg, oral, Nightly PRN, Kelly Gould MD, 5 mg at 03/27/24 0048                                                                            Labs     Results for orders placed or performed during the hospital encounter of 03/26/24 (from the past 24 hour(s))   POCT GLUCOSE   Result Value Ref Range    POCT Glucose 79 74 - 99 mg/dL   CBC   Result Value Ref Range    WBC 6.5 4.4 - 11.3 x10*3/uL    nRBC 0.0 0.0 - 0.0 /100 WBCs    RBC 4.47 4.00 - 5.20 x10*6/uL    Hemoglobin 12.2 12.0 - 16.0 g/dL    Hematocrit 36.7 36.0 - 46.0 %    MCV 82 80 - 100 fL    MCH 27.3 26.0 - 34.0 pg    MCHC 33.2 32.0 - 36.0 g/dL    RDW 13.7 11.5 - 14.5 %    Platelets 215 150 - 450 x10*3/uL   Comprehensive metabolic panel   Result Value Ref Range    Glucose 108 (H) 74 - 99 mg/dL    Sodium 140 136 - 145 mmol/L    Potassium 4.1 3.5 - 5.3 mmol/L    Chloride 103 98 - 107 mmol/L    Bicarbonate  26 21 - 32 mmol/L    Anion Gap 15 10 - 20 mmol/L    Urea Nitrogen 16 6 - 23 mg/dL    Creatinine 0.89 0.50 - 1.05 mg/dL    eGFR 69 >60 mL/min/1.73m*2    Calcium 10.0 8.6 - 10.6 mg/dL    Albumin 4.2 3.4 - 5.0 g/dL    Alkaline Phosphatase 186 (H) 33 - 136 U/L    Total Protein 6.9 6.4 - 8.2 g/dL    AST 54 (H) 9 - 39 U/L    Bilirubin, Total 0.6 0.0 - 1.2 mg/dL     (H) 7 - 45 U/L   Phosphorus   Result Value Ref Range    Phosphorus 2.9 2.5 - 4.9 mg/dL                                                                              Imaging           MRCP 3/27/24  IMPRESSION:  1.  Questionable 1.0 x 0.7 cm lesion in the pancreatic head which demonstrates delayed contrast enhancement and questionable mild restricted diffusion. This lesion appears to exert mass effect upon the distal common bile duct and may represent a primary pancreatic neoplasm such as a pancreatic adenocarcinoma.  2. Minimal to mild intrahepatic biliary ductal dilatation and common bile duct dilatation measuring up to 1.2 cm.  3. No evidence of abdominal lymphadenopathy.  4. Trace perihepatic and perisplenic fluid.  5. Trace bibasilar pulmonary effusions.                                                                           GI Procedures     None available for review. Patient states that she is up to date on CRC screening.    ASSESSMENT / PLAN                  ASSESSMENT/PLAN:  Kallie Watkins is a 74 y.o. female admitted on 3/26/2024 with elevated transaminases and incidentally noted mass in the pancreatic head, for which GI is consulted. Notably, patient was initially admitted to [facility] with transaminases of 2000 and 1700 and these are improving, but there was never a clear etiology for this acute rise. There was never evidence of synthetic liver dysfunction. It is unclear if this was a transient process related to the pancreatic head mass, or was related to an external cause (tylenol, virus, ischemia) etc. Interestingly, tumor markers are  normal/ negative, as is initial work up for chronic liver disease.  Patient's initial symptoms of nausea, vomiting and diarrhea have resolved. At this time, there is no evidence of obstructive process. 1cm cystic lesion was most likely an incidental finding. It is possible that she passed a gallstone or has sludge in her biliary tree and this could have caused initial event, with elevated transaminases.    Recommendations:  -Patient has been referred for EUS/ FNA as an outpatient  -Recommend 2g acetaminophen daily max  -Recheck hepatic function panel as outpatient. This can be followed by PCP.      Patient was seen and discussed with Dr. Meléndez    Recommendations communicated with the primary team via secure chat.  Thank you for the consultation.  GI will sign off.  Please do not hesitate to contact us again on Epic Chat or by paging the consultation team at [phone] between the weekday hours of 7 AM - 5 PM.  If there is an urgent concern during the weekend, after-hours, or holidays; then please page the on-call GI fellow at [phone]. Thank you.      Emmie Christopher MD  Gastroenterology and Hepatology Fellow  [facility]

## 2024-03-27 NOTE — CARE PLAN
The patient's goals for the shift include  to have decreased pain and improving lab work    The clinical goals for the shift include pt to have decreased and more manageable pain throughout shift      Problem: Pain  Goal: Takes deep breaths with improved pain control throughout the shift  Outcome: Progressing  Goal: Turns in bed with improved pain control throughout the shift  Outcome: Progressing  Goal: Walks with improved pain control throughout the shift  Outcome: Progressing  Goal: Performs ADL's with improved pain control throughout shift  Outcome: Progressing  Goal: Participates in PT with improved pain control throughout the shift  Outcome: Progressing  Goal: Free from opioid side effects throughout the shift  Outcome: Progressing  Goal: Free from acute confusion related to pain meds throughout the shift  Outcome: Progressing     Problem: Pain - Adult  Goal: Verbalizes/displays adequate comfort level or baseline comfort level  Outcome: Progressing     Problem: Safety - Adult  Goal: Free from fall injury  Outcome: Progressing     Problem: Discharge Planning  Goal: Discharge to home or other facility with appropriate resources  Outcome: Progressing     Problem: Chronic Conditions and Co-morbidities  Goal: Patient's chronic conditions and co-morbidity symptoms are monitored and maintained or improved  Outcome: Progressing     Problem: Fall/Injury  Goal: Not fall by end of shift  Outcome: Progressing  Goal: Be free from injury by end of the shift  Outcome: Progressing  Goal: Verbalize understanding of personal risk factors for fall in the hospital  Outcome: Progressing  Goal: Verbalize understanding of risk factor reduction measures to prevent injury from fall in the home  Outcome: Progressing  Goal: Use assistive devices by end of the shift  Outcome: Progressing  Goal: Pace activities to prevent fatigue by end of the shift  Outcome: Progressing

## 2024-03-27 NOTE — PROGRESS NOTES
03/27/24 1008   Discharge Planning   Living Arrangements Alone   Support Systems Family members   Assistance Needed None   Type of Residence Private residence   Who is requesting discharge planning? Patient   Home or Post Acute Services None   Patient expects to be discharged to: Home   Does the patient need discharge transport arranged? No  (brother will provide)   Financial Resource Strain   How hard is it for you to pay for the very basics like food, housing, medical care, and heating? Not hard   Housing Stability   In the last 12 months, was there a time when you were not able to pay the mortgage or rent on time? N   In the last 12 months, was there a time when you did not have a steady place to sleep or slept in a shelter (including now)? N   Transportation Needs   In the past 12 months, has lack of transportation kept you from medical appointments or from getting medications? no   In the past 12 months, has lack of transportation kept you from meetings, work, or from getting things needed for daily living? No     Assessment Note:  Met with pt and introduced myself as care coordinator and member of the Care Transitions team for discharge planning.   Pt feels safe at home.  Pt drives to [place] [address].  Pt's address, phone number and contact information was verified.  Pt does not have any questions/concerns at this time.     Previous Home Care: None  DME: [employer] (for balance).  Pharmacy: [facility] in [place]  Falls: Lost balance recently, no injury.  PCP:  Henrietta Marks (last visit 6 months ago).    Ronna Clemens MSN, RN-BC  Transitional Care Coordinator (TCC)  [phone]

## 2024-03-27 NOTE — CARE PLAN
The clinical goals for the shift include Pts pain will be managed throughout shift    Problem: Pain  Goal: Takes deep breaths with improved pain control throughout the shift  Outcome: Progressing  Goal: Turns in bed with improved pain control throughout the shift  Outcome: Progressing  Goal: Walks with improved pain control throughout the shift  Outcome: Progressing  Goal: Performs ADL's with improved pain control throughout shift  Outcome: Progressing  Goal: Participates in PT with improved pain control throughout the shift  Outcome: Progressing  Goal: Free from opioid side effects throughout the shift  Outcome: Progressing  Goal: Free from acute confusion related to pain meds throughout the shift  Outcome: Progressing     Problem: Pain - Adult  Goal: Verbalizes/displays adequate comfort level or baseline comfort level  Outcome: Progressing     Problem: Safety - Adult  Goal: Free from fall injury  Outcome: Progressing     Problem: Discharge Planning  Goal: Discharge to home or other facility with appropriate resources  Outcome: Progressing     Problem: Chronic Conditions and Co-morbidities  Goal: Patient's chronic conditions and co-morbidity symptoms are monitored and maintained or improved  Outcome: Progressing     Problem: Fall/Injury  Goal: Not fall by end of shift  Outcome: Progressing  Goal: Be free from injury by end of the shift  Outcome: Progressing  Goal: Verbalize understanding of personal risk factors for fall in the hospital  Outcome: Progressing  Goal: Verbalize understanding of risk factor reduction measures to prevent injury from fall in the home  Outcome: Progressing  Goal: Use assistive devices by end of the shift  Outcome: Progressing  Goal: Pace activities to prevent fatigue by end of the shift  Outcome: Progressing

## 2024-03-27 NOTE — PROGRESS NOTES
Kallie Watkins is a 74 y.o. female on day 1 of admission presenting with Elevated liver enzymes.    Subjective   NAEON. Patient reports she ate a big meal with milkshake last night and had diarrhea. Otherwise had no vomiting or nausea. Was tearful about her health this morning. Was not happy to hear that there was a lesion seen in the pancreas on MRCP since her father  of pancreatic cancer. 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 Would like more answers while here.        Objective     Physical Exam  Physical Exam  Constitutional:       General: She is not in acute distress.     Appearance: Normal appearance.   HENT:      Head: Normocephalic and atraumatic.   Eyes:      General: No scleral icterus.     Extraocular Movements: Extraocular movements intact.   Cardiovascular:      Rate and Rhythm: Normal rate.   Pulmonary:      Effort: Pulmonary effort is normal.   Abdominal:      General: Abdomen is flat. There is no distension.      Palpations: Abdomen is soft.   Musculoskeletal:         General: Normal range of motion.      Right lower leg: No edema.      Left lower leg: No edema.   Skin:     General: Skin is warm and dry.      Coloration: Skin is not jaundiced.   Neurological:      General: No focal deficit present.      Mental Status: She is alert and oriented to person, place, and time.      Cranial Nerves: No cranial nerve deficit.   Psychiatric:         Mood and Affect: Mood normal.     Last Recorded Vitals  Blood pressure 141/80, pulse 71, temperature 37.1 °C (98.8 °F), resp. rate 16, SpO2 95 %.  Intake/Output last 3 Shifts:  No intake/output data recorded.    Relevant Results  Results for orders placed or performed during the hospital encounter of 24 (from the past 24 hour(s))   POCT GLUCOSE   Result Value Ref Range    POCT Glucose 79 74 - 99 mg/dL   CBC   Result Value Ref Range    WBC 6.5 4.4 - 11.3 x10*3/uL    nRBC 0.0 0.0 - 0.0 /100 WBCs    RBC 4.47 4.00 -  5.20 x10*6/uL    Hemoglobin 12.2 12.0 - 16.0 g/dL    Hematocrit 36.7 36.0 - 46.0 %    MCV 82 80 - 100 fL    MCH 27.3 26.0 - 34.0 pg    MCHC 33.2 32.0 - 36.0 g/dL    RDW 13.7 11.5 - 14.5 %    Platelets 215 150 - 450 x10*3/uL   Comprehensive metabolic panel   Result Value Ref Range    Glucose 108 (H) 74 - 99 mg/dL    Sodium 140 136 - 145 mmol/L    Potassium 4.1 3.5 - 5.3 mmol/L    Chloride 103 98 - 107 mmol/L    Bicarbonate 26 21 - 32 mmol/L    Anion Gap 15 10 - 20 mmol/L    Urea Nitrogen 16 6 - 23 mg/dL    Creatinine 0.89 0.50 - 1.05 mg/dL    eGFR 69 >60 mL/min/1.73m*2    Calcium 10.0 8.6 - 10.6 mg/dL    Albumin 4.2 3.4 - 5.0 g/dL    Alkaline Phosphatase 186 (H) 33 - 136 U/L    Total Protein 6.9 6.4 - 8.2 g/dL    AST 54 (H) 9 - 39 U/L    Bilirubin, Total 0.6 0.0 - 1.2 mg/dL     (H) 7 - 45 U/L   Phosphorus   Result Value Ref Range    Phosphorus 2.9 2.5 - 4.9 mg/dL                            Assessment/Plan   Principal Problem:    Elevated liver enzymes       Ms. Watkins is a 74yoF w PMHx notable for fibromyalgia, HTN, arthritis, HTN, narcolepsy presenting with 1.5 years of fatigue, 6 months of night sweats and n/v, labs notable for significant transaminitis, and CT AP w double duct sign, concerning for pancreatic cancer. Ddx includes hepatotoxicity from pts chronic tylenol use, but tylenol level 0.9 and pt takes 2800mg MAX of acetaminophen, and would not explain imaging findings, vs DILI vs gallstone vs other hepatic pathology. Will plan for MRCP and send off acute hepatitis panel as well as tumor markers CEA, , SHELBIE, anti sm, anti mitochondrial ab, alpha1 antitrypsin. Will consult GI.     3/27 UPDATES   - MRCP showing 1.0 x 0.7cm lesion in pancreatic head   - GI consulted   - NPO in case of procedure   - LFTs downtrending to ALT//54    #transaminitis  #c/f pancreatic malignancy   :: AST >2000, ALT 1762, alk phos 278  :: CT AP with double duct sign  :: takes excedrin max 2x a day, max 2 500mg  tylenol a day, and norco 325-7.5 qid (2800mg acteaminophen/day)  -acute hepatitis panel   -MRCP pancreas   -NPO   -repeat LFTs  -consult gi      #narcolepsy   -cw home ritalin 20 bid     #fibromyalgia   - hold homd norco 325-7.5 qid   - cw home metaxalone 800 tid prn  - tylenol 650 qid prn for pain  - oxy 5 q6h prn for severe/breakthrough pain     #htn  -cw home lisinopril 10      #hld   -cw home atorvastatin 20      #insomnia  -zolpidem 5 at bedtime     #GERD  -pepcid 20 daily     #constipation   -senna prn      F: prn   E: mg>2 k > 4 prn   N: NPO   GI ppx: not indicated  DVT ppx: SCDs     FULL CODE   NOK: Arya Watkins (brother) [phone]                      Kelly Gould MD

## 2024-03-27 NOTE — PROGRESS NOTES
Transitional Care Coordination Progress Note:  Patient discussed during interdisciplinary rounds.   Team members present: MD, JOÃO  Plan per Medical/Surgical team: elevated liver enzymes; GI consulted  Payor: [facility]  Discharge disposition: pt/ot pending  Potential Barriers: none  ADOD: 3/30  Will continue to monitor for discharge planning needs.     Ami LAWSONN, RN  Transitional Care Coordinator (TCC)  [phone]

## 2024-03-27 NOTE — PROGRESS NOTES
Physical Therapy    Physical Therapy Evaluation    Patient Name: Kallie Watkins  MRN: [id number]  Today's Date: 3/27/2024   Time Calculation  Start Time: 1349  Stop Time: 1406  Time Calculation (min): 17 min    Assessment/Plan   PT Assessment  PT Assessment Results: Decreased endurance, Impaired balance, Decreased mobility  Rehab Prognosis: Good  Barriers to Discharge: none  Evaluation/Treatment Tolerance: Patient tolerated treatment well  End of Session Communication: Bedside nurse  Assessment Comment: 74 y.o. F admitted for work up for CA, currently demonstrating decreased activity tolerance from baseline and will benefit from continued PT while in house to further assess and promote activity as tolerated.  End of Session Patient Position: Bed, 3 rail up, Alarm off, not on at start of session  IP OR SWING BED PT PLAN  Inpatient or Swing Bed: Inpatient  PT Plan  Treatment/Interventions: Bed mobility, Transfer training, Gait training, Balance training, Strengthening, Endurance training, Therapeutic exercise, Therapeutic activity  PT Plan: Skilled PT  PT Frequency: 3 times per week  PT Discharge Recommendations: No PT needed after discharge (Pending length of stay and possible interventions, may require PT re-evaluation as needed.)  PT Recommended Transfer Status: Stand by assist  PT - OK to Discharge: Yes      Subjective   General Visit Information:  General  Reason for Referral: elevated liver ezymes  Past Medical History Relevant to Rehab: 74yoF w PMHx notable for fibromyalgia, HTN, arthritis, HTN, narcolepsy presenting with 1.5 years of fatigue, 6 months of night sweats and n/v, labs notable for significant transaminitis, admitted for work up for possible pancreatic cancer.  Family/Caregiver Present: No  Patient Position Received: Bed, 2 rail up  Preferred Learning Style: verbal  General Comment: Pt resting in bed upon entry. Very pleasant and cooperative. Willing to work with PT. Pt noting not wanting to get weak  "while in hospital and wants PT to stay involved with patient during stay.  Home Living:  Home Living  Type of Home: House  Lives With: Alone  Home Adaptive Equipment: Cane  Home Layout: Two level, Bed/bath upstairs (flight of stairs in house)  Home Access: Stairs to enter with rails  Entrance Stairs-Number of Steps: 4  Prior Level of Function:  Prior Function Per Pt/Caregiver Report  Level of [place]: Independent with ADLs and functional transfers, Independent with homemaking with ambulation, Independent with homemaking with wheelchair  Receives Help From:  (No help needed at baseline)  ADL Assistance: Independent  Homemaking Assistance: Independent  Ambulatory Assistance: Independent  Prior Function Comments: Pt reports having become more fatigued w functional activity in days/weeks leading to current admission.  Precautions:  Precautions  Medical Precautions: Fall precautions    Objective   Pain:  Pain Assessment  Pain Assessment: 0-10  Pain Score:  (Pt notes h/o fibromyalgia and \"always having pain.\" Pt notes she was recently medicated and to receive a pain patch from RN soon for shoulder.)  Pain Type: Chronic pain  Cognition:  Cognition  Overall Cognitive Status: Within Functional Limits  Orientation Level: Oriented X4  Attention: Within Functional Limits  Insight: Within function limits  Impulsive: Within functional limits    General Assessments:    Activity Tolerance  Endurance: Endurance does not limit participation in activity    Sensation  Light Touch:  (h/o neuropathy)  Sensation Comment: baseline neuropathy    Strength  Strength Comments: Grossly >4+/5 throughout BLE based on function.  Strength  Strength Comments: Grossly >4+/5 throughout BLE based on function.    Coordination  Movements are Fluid and Coordinated: Yes    Postural Control  Postural Control: Within Functional Limits  Posture Comment: Slight sway w ambulation.    Static Sitting Balance  Static Sitting-Level of Assistance: " Independent    Static Standing Balance  Static Standing-Balance Support: Right upper extremity supported (cane)  Static Standing-Level of Assistance: Contact guard  Functional Assessments:       Bed Mobility  Bed Mobility: Yes  Bed Mobility 1  Bed Mobility 1: Supine to sitting, Sitting to supine  Level of Assistance 1: Distant supervision    Transfers  Transfer: Yes  Transfer 1  Transfer From 1: Sit to, Stand to  Transfer to 1: Stand, Sit  Transfer Device 1: Cane  Transfer Level of Assistance 1: Contact guard, Close supervision    Ambulation/Gait Training  Ambulation/Gait Training Performed: Yes  Ambulation/Gait Training 1  Surface 1: Level tile  Device 1: Single point cane  Assistance 1: Contact guard  Quality of Gait 1:  (Unsteady gait without use of assistive device and increased sway. Demonstrated improved may and stability w use of cane.)  Comments/Distance (ft) 1: 15ft x 1 (without cane), 100ft x1 (with cane)    Outcome Measures:  [facility] Basic Mobility  Turning from your back to your side while in a flat bed without using bedrails: None  Moving from lying on your back to sitting on the side of a flat bed without using bedrails: None  Moving to and from bed to chair (including a wheelchair): None  Standing up from a chair using your arms (e.g. wheelchair or bedside chair): None  To walk in hospital room: A little  Climbing 3-5 steps with railing: A little  Basic Mobility - Total Score: 22    Encounter Problems       Encounter Problems (Active)       Mobility       STG - Patient will ambulate >150ft, least restrictive device, independent       Start:  03/27/24    Expected End:  04/10/24            STG - Patient will ascend and descend four to six stairs independent       Start:  03/27/24    Expected End:  04/10/24               PT Transfers       STG - Patient to transfer to and from sit to supine independent       Start:  03/27/24    Expected End:  04/10/24            STG - Patient will transfer sit to and  from stand independent       Start:  03/27/24    Expected End:  04/10/24               Pain - Adult              Education Documentation  Mobility Training, taught by Deep Brice PT at 3/27/2024  2:47 PM.  Learner: Patient  Readiness: Acceptance  Method: Explanation  Response: Verbalizes Understanding    Education Comments  No comments found.

## 2024-03-27 NOTE — DISCHARGE INSTRUCTIONS
Ms. Watkins,     You were admitted as a transfer from [facility] after having abdominal pain and nausea and CT imaging scan showing a lesion in the pancreas and elevated liver numbers. At [facility] we did an MRI of your pancreas and it did show a small lesion in the pancreas that needs to be biopsied but can be done as an outpatient and the gastroenterology team will be in contact with you. Additionally your liver numbers went down and continue to improve.     You should follow up with your primary care doctor within the next 2 weeks to discuss your hospitalization and get another set of liver numbers to ensure they are continuing to improve.     I would recommend you limit your tylenol to ONLY your Norco's for the time being until your liver enzymes are back to normal or at least do NOT exceed 2,000mg daily to avoid further damage.     The following numbers are for the GI department please call if you do not hear from them by Monday or Tuesday of next week to schedule your EGD (endoscopy) and EUS (endoscopic ultrasound) with biopsies.   GI Department number [phone]    We hope you continue to feel better.

## 2024-03-28 NOTE — HOSPITAL COURSE
Ms. Watkins is a 74yoF w PMHx notable for fibromyalgia, HTN, arthritis, HTN, narcolepsy presenting with 1.5 years of fatigue, 6 months of night sweats and n/v, labs notable for significant transaminitis, and CT AP w double duct sign, concerning for pancreatic cancer. Ddx includes hepatotoxicity from pts chronic tylenol use, but tylenol level 0.9 and pt takes 2800mg MAX of acetaminophen, and would not explain imaging findings, vs DILI vs gallstone vs other hepatic pathology. Will plan for MRCP and send off acute hepatitis panel as well as tumor markers CEA, , SHELBIE, anti sm, anti mitochondrial ab, alpha1 antitrypsin. Acute hepatatis panel negative and all tumor markers negative. MRCP showing small 1cm lesion in pancreatic head, could represent pancreatic adenocarcinoma vs cystic lesion. Seen by GI during visit, will follow up outpatient for potential eus and/or fna. Pt also advised to take maximum 2g tylenol a day iso liver injury.     [ ] iliana candelario GI

## 2024-03-28 NOTE — DISCHARGE SUMMARY
Discharge Diagnosis  Elevated liver enzymes    Issues Requiring Follow-Up  [ ] fu w GI    Test Results Pending At Discharge  Pending Labs       Order Current Status    Alpha-1 Antitrypsin Phenotype In process            Hospital Course  Ms. Watkins is a 74yoF w PMHx notable for fibromyalgia, HTN, arthritis, HTN, narcolepsy presenting with 1.5 years of fatigue, 6 months of night sweats and n/v, labs notable for significant transaminitis, and CT AP w double duct sign, concerning for pancreatic cancer. Ddx includes hepatotoxicity from pts chronic tylenol use, but tylenol level 0.9 and pt takes 2800mg MAX of acetaminophen, and would not explain imaging findings, vs DILI vs gallstone vs other hepatic pathology. Will plan for MRCP and send off acute hepatitis panel as well as tumor markers CEA, , SHELBIE, anti sm, anti mitochondrial ab, alpha1 antitrypsin. Acute hepatatis panel negative and all tumor markers negative. MRCP showing small 1cm lesion in pancreatic head, could represent pancreatic adenocarcinoma vs cystic lesion. Seen by GI during visit, will follow up outpatient for potential eus and/or fna. Pt also advised to take maximum 2g tylenol a day iso liver injury.     [ ] fu w GI       Pertinent Physical Exam At Time of Discharge  Physical Exam  Physical Exam  Constitutional:       General: She is not in acute distress.     Appearance: Normal appearance.   HENT:      Head: Normocephalic and atraumatic.   Eyes:      General: No scleral icterus.     Extraocular Movements: Extraocular movements intact.   Cardiovascular:      Rate and Rhythm: Normal rate.   Pulmonary:      Effort: Pulmonary effort is normal.   Abdominal:      General: Abdomen is flat. There is no distension.      Palpations: Abdomen is soft.   Musculoskeletal:         General: Normal range of motion.      Right lower leg: No edema.      Left lower leg: No edema.   Skin:     General: Skin is warm and dry.      Coloration: Skin is not jaundiced.    Neurological:      General: No focal deficit present.      Mental Status: She is alert and oriented to person, place, and time.      Cranial Nerves: No cranial nerve deficit.   Psychiatric:         Mood and Affect: Mood normal.     Home Medications     Medication List      CONTINUE taking these medications     famotidine 20 mg tablet; Commonly known as: Pepcid   Gas Relief Ultra Strength 180 mg capsule; Generic drug: simethicone   glucosamine-chondroitin 500-400 mg tablet   HYDROcodone-acetaminophen 7.5-325 mg tablet; Commonly known as: Norco   lisinopril 10 mg tablet   loratadine 10 mg tablet; Commonly known as: Claritin   metaxalone 800 mg tablet; Commonly known as: Skelaxin   methylphenidate 20 mg tablet; Commonly known as: Ritalin   zolpidem 5 mg tablet; Commonly known as: Ambien     STOP taking these medications     aspirin-acetaminophen-caffeine 250-250-65 mg tablet; Commonly known as:   Excedrin Migraine       Outpatient Follow-Up  No future appointments.    Kelly Gould MD

## 2024-03-29 LAB
A1AT PHENOTYP SERPL-IMP: ABNORMAL
A1AT SERPL-MCNC: 229 MG/DL (ref 90–200)

## 2024-04-15 RX ORDER — FLUMAZENIL 0.1 MG/ML
0.2 INJECTION INTRAVENOUS ONCE AS NEEDED
Status: CANCELLED | OUTPATIENT
Start: 2024-04-15

## 2024-04-15 RX ORDER — ONDANSETRON HYDROCHLORIDE 2 MG/ML
4 INJECTION, SOLUTION INTRAVENOUS ONCE AS NEEDED
Status: CANCELLED | OUTPATIENT
Start: 2024-04-15

## 2024-04-15 RX ORDER — NALOXONE HYDROCHLORIDE 0.4 MG/ML
0.2 INJECTION, SOLUTION INTRAMUSCULAR; INTRAVENOUS; SUBCUTANEOUS EVERY 5 MIN PRN
Status: CANCELLED | OUTPATIENT
Start: 2024-04-15

## 2024-04-15 RX ORDER — SODIUM CHLORIDE, SODIUM LACTATE, POTASSIUM CHLORIDE, CALCIUM CHLORIDE 600; 310; 30; 20 MG/100ML; MG/100ML; MG/100ML; MG/100ML
20 INJECTION, SOLUTION INTRAVENOUS CONTINUOUS
Status: CANCELLED | OUTPATIENT
Start: 2024-04-15

## 2024-04-16 ENCOUNTER — ANESTHESIA (OUTPATIENT)
Dept: GASTROENTEROLOGY | Facility: HOSPITAL | Age: 74
End: 2024-04-16
Payer: COMMERCIAL

## 2024-04-16 ENCOUNTER — ANESTHESIA EVENT (OUTPATIENT)
Dept: GASTROENTEROLOGY | Facility: HOSPITAL | Age: 74
End: 2024-04-16
Payer: COMMERCIAL

## 2024-04-16 ENCOUNTER — HOSPITAL ENCOUNTER (OUTPATIENT)
Dept: GASTROENTEROLOGY | Facility: HOSPITAL | Age: 74
Setting detail: OUTPATIENT SURGERY
Discharge: HOME | End: 2024-04-16
Payer: COMMERCIAL

## 2024-04-16 VITALS
OXYGEN SATURATION: 95 % | WEIGHT: 127 LBS | TEMPERATURE: 97.3 F | RESPIRATION RATE: 14 BRPM | HEIGHT: 61 IN | DIASTOLIC BLOOD PRESSURE: 70 MMHG | SYSTOLIC BLOOD PRESSURE: 120 MMHG | HEART RATE: 55 BPM | BODY MASS INDEX: 23.98 KG/M2

## 2024-04-16 DIAGNOSIS — K86.2 PANCREATIC CYST (HHS-HCC): ICD-10-CM

## 2024-04-16 PROBLEM — R06.02 SHORTNESS OF BREATH: Status: ACTIVE | Noted: 2024-04-16

## 2024-04-16 PROCEDURE — 88305 TISSUE EXAM BY PATHOLOGIST: CPT | Performed by: PATHOLOGY

## 2024-04-16 PROCEDURE — 3700000002 HC GENERAL ANESTHESIA TIME - EACH INCREMENTAL 1 MINUTE

## 2024-04-16 PROCEDURE — 0753T DGTZ GLS MCRSCP SLD LEVEL IV: CPT | Mod: TC,SUR | Performed by: INTERNAL MEDICINE

## 2024-04-16 PROCEDURE — 3700000001 HC GENERAL ANESTHESIA TIME - INITIAL BASE CHARGE

## 2024-04-16 PROCEDURE — 2500000005 HC RX 250 GENERAL PHARMACY W/O HCPCS: Performed by: ANESTHESIOLOGIST ASSISTANT

## 2024-04-16 PROCEDURE — 99100 ANES PT EXTEME AGE<1 YR&>70: CPT | Performed by: ANESTHESIOLOGY

## 2024-04-16 PROCEDURE — A43237 PR ESOPHAGOGASTRODUODENOSCOPY US SCOPE W/ADJ STRXRS: Performed by: ANESTHESIOLOGY

## 2024-04-16 PROCEDURE — 2500000004 HC RX 250 GENERAL PHARMACY W/ HCPCS (ALT 636 FOR OP/ED): Performed by: ANESTHESIOLOGIST ASSISTANT

## 2024-04-16 PROCEDURE — 43239 EGD BIOPSY SINGLE/MULTIPLE: CPT | Performed by: INTERNAL MEDICINE

## 2024-04-16 PROCEDURE — 7100000010 HC PHASE TWO TIME - EACH INCREMENTAL 1 MINUTE

## 2024-04-16 PROCEDURE — A43237 PR ESOPHAGOGASTRODUODENOSCOPY US SCOPE W/ADJ STRXRS: Performed by: ANESTHESIOLOGIST ASSISTANT

## 2024-04-16 PROCEDURE — 43259 EGD US EXAM DUODENUM/JEJUNUM: CPT | Performed by: INTERNAL MEDICINE

## 2024-04-16 PROCEDURE — 7100000009 HC PHASE TWO TIME - INITIAL BASE CHARGE

## 2024-04-16 RX ORDER — FENTANYL CITRATE 50 UG/ML
INJECTION, SOLUTION INTRAMUSCULAR; INTRAVENOUS AS NEEDED
Status: DISCONTINUED | OUTPATIENT
Start: 2024-04-16 | End: 2024-04-16

## 2024-04-16 RX ORDER — OXYCODONE HYDROCHLORIDE 5 MG/1
5 TABLET ORAL EVERY 4 HOURS PRN
OUTPATIENT
Start: 2024-04-16

## 2024-04-16 RX ORDER — LIDOCAINE HYDROCHLORIDE 20 MG/ML
INJECTION, SOLUTION INFILTRATION; PERINEURAL AS NEEDED
Status: DISCONTINUED | OUTPATIENT
Start: 2024-04-16 | End: 2024-04-16

## 2024-04-16 RX ORDER — ONDANSETRON HYDROCHLORIDE 2 MG/ML
4 INJECTION, SOLUTION INTRAVENOUS ONCE AS NEEDED
OUTPATIENT
Start: 2024-04-16

## 2024-04-16 RX ORDER — SODIUM CHLORIDE, SODIUM LACTATE, POTASSIUM CHLORIDE, CALCIUM CHLORIDE 600; 310; 30; 20 MG/100ML; MG/100ML; MG/100ML; MG/100ML
100 INJECTION, SOLUTION INTRAVENOUS CONTINUOUS
OUTPATIENT
Start: 2024-04-16

## 2024-04-16 RX ORDER — FENTANYL CITRATE 50 UG/ML
50 INJECTION, SOLUTION INTRAMUSCULAR; INTRAVENOUS EVERY 5 MIN PRN
OUTPATIENT
Start: 2024-04-16

## 2024-04-16 RX ORDER — LIDOCAINE HYDROCHLORIDE 10 MG/ML
0.1 INJECTION INFILTRATION; PERINEURAL ONCE
OUTPATIENT
Start: 2024-04-16 | End: 2024-04-16

## 2024-04-16 RX ORDER — ACETAMINOPHEN 325 MG/1
650 TABLET ORAL EVERY 4 HOURS PRN
OUTPATIENT
Start: 2024-04-16

## 2024-04-16 RX ORDER — PROPOFOL 10 MG/ML
INJECTION, EMULSION INTRAVENOUS CONTINUOUS PRN
Status: DISCONTINUED | OUTPATIENT
Start: 2024-04-16 | End: 2024-04-16

## 2024-04-16 RX ADMIN — LIDOCAINE HYDROCHLORIDE 50 MG: 20 INJECTION, SOLUTION INFILTRATION; PERINEURAL at 09:36

## 2024-04-16 RX ADMIN — PROPOFOL 200 MCG/KG/MIN: 10 INJECTION, EMULSION INTRAVENOUS at 09:36

## 2024-04-16 RX ADMIN — SODIUM CHLORIDE, SODIUM LACTATE, POTASSIUM CHLORIDE, AND CALCIUM CHLORIDE: 600; 310; 30; 20 INJECTION, SOLUTION INTRAVENOUS at 09:28

## 2024-04-16 RX ADMIN — FENTANYL CITRATE 50 MCG: 50 INJECTION, SOLUTION INTRAMUSCULAR; INTRAVENOUS at 09:36

## 2024-04-16 RX ADMIN — FENTANYL CITRATE 50 MCG: 50 INJECTION, SOLUTION INTRAMUSCULAR; INTRAVENOUS at 09:39

## 2024-04-16 ASSESSMENT — ENCOUNTER SYMPTOMS
DIARRHEA: 0
ABDOMINAL DISTENTION: 0
ABDOMINAL PAIN: 0
COLOR CHANGE: 0
UNEXPECTED WEIGHT CHANGE: 0
TROUBLE SWALLOWING: 0
ANAL BLEEDING: 0
CONSTIPATION: 0
NAUSEA: 0
BLOOD IN STOOL: 0
VOMITING: 0
CHILLS: 0
FEVER: 0
RECTAL PAIN: 0
SHORTNESS OF BREATH: 0

## 2024-04-16 ASSESSMENT — PAIN SCALES - GENERAL
PAINLEVEL_OUTOF10: 0 - NO PAIN
PAINLEVEL_OUTOF10: 0 - NO PAIN
PAINLEVEL_OUTOF10: 3
PAINLEVEL_OUTOF10: 0 - NO PAIN

## 2024-04-16 ASSESSMENT — PAIN - FUNCTIONAL ASSESSMENT
PAIN_FUNCTIONAL_ASSESSMENT: 0-10

## 2024-04-16 ASSESSMENT — COLUMBIA-SUICIDE SEVERITY RATING SCALE - C-SSRS
2. HAVE YOU ACTUALLY HAD ANY THOUGHTS OF KILLING YOURSELF?: NO
6. HAVE YOU EVER DONE ANYTHING, STARTED TO DO ANYTHING, OR PREPARED TO DO ANYTHING TO END YOUR LIFE?: NO
1. IN THE PAST MONTH, HAVE YOU WISHED YOU WERE DEAD OR WISHED YOU COULD GO TO SLEEP AND NOT WAKE UP?: NO

## 2024-04-16 NOTE — H&P
"History Of Present Illness  Kallie Watkins \"Marc" is a 74 y.o. female who was admitted on 3/26/2024 with elevated transaminases (2000s) and incidentally noted lesion in the pancreatic head at [place]. There was never evidence of synthetic liver dysfunction. Tumor markers were normal/ negative, as was initial work up for chronic liver disease. Patient's initial symptoms of nausea, vomiting and diarrhea resolved. MRCP was then obtained at [facility] with no findings of a pancreatic lesion. Unclear if symptoms were due to an acute viral hepatitis vs passed gallstone/sludge. Father  from pancreatic cancer in his 50's. She is referred for EUS for further evaluation.      Past Medical History  Past Medical History:   Diagnosis Date    Arthritis     Fibromyalgia     HTN (hypertension)     Mitral valve prolapse     Narcolepsy ([facility])     Pancreatic lesion ([facility])        Surgical History  History reviewed. No pertinent surgical history.     Social History  She reports that she has quit smoking. Her smoking use included cigarettes. She has never used smokeless tobacco. She reports that she does not drink alcohol and does not use drugs.    Family History  No family history on file.     Allergies  Ampicillin    Review of Systems   Constitutional:  Negative for chills, fever and unexpected weight change.   HENT:  Negative for trouble swallowing.    Respiratory:  Negative for shortness of breath.    Cardiovascular:  Negative for chest pain.   Gastrointestinal:  Negative for abdominal distention, abdominal pain, anal bleeding, blood in stool, constipation, diarrhea, nausea, rectal pain and vomiting.   Skin:  Negative for color change.          Physical Exam  Vitals reviewed.   Constitutional:       General: She is awake.   Pulmonary:      Effort: Pulmonary effort is normal.      Breath sounds: Normal breath sounds.   Abdominal:      General: Bowel sounds are normal.      Palpations: Abdomen is soft.      Tenderness: " "There is no abdominal tenderness.   Neurological:      Mental Status: She is alert and oriented to person, place, and time.   Psychiatric:         Attention and Perception: Attention and perception normal.         Behavior: Behavior normal.            Last Recorded Vitals  Blood pressure 120/70, pulse 55, temperature 36.3 °C (97.3 °F), temperature source Tympanic, resp. rate 14, height 1.549 m (5' 1\"), weight 57.6 kg (127 lb), SpO2 95%.    Relevant Results  Reviewed chart       Assessment/Plan   Proceed with EUS         Alexia Hernandez MD   "

## 2024-04-16 NOTE — DISCHARGE INSTRUCTIONS
Patient Instructions after an endoscopic ultrasound      The anesthetics, sedatives or narcotics which were given to you today will be acting in your body for the next 24 hours, so you might feel a little sleepy or groggy.  This feeling should slowly wear off. Carefully read and follow the instructions.     You received sedation today:  - Do not drive or operate any machinery or power tools of any kind.   - No alcoholic beverages today, not even beer or wine.  - Do not make any important decisions or sign any legal documents.  - No over the counter medications that contain alcohol or that may cause drowsiness.  - Do not make any important decisions or sign any legal documents.    While it is common to experience mild to moderate abdominal distention, gas, or belching after your procedure, if any of these symptoms occur following discharge from the GI Lab or within one week of having your procedure, call the [facility] to be advised whether a visit to your nearest Urgent Care or Emergency Department is indicated.  Take this paper with you if you go.     - If you develop an allergic reaction to the medications that were given during your procedure such as difficulty breathing, rash, hives, severe nausea, vomiting or lightheadedness.- If you experience chest pain, shortness of breath, severe abdominal pain, fevers and chills.    -If you develop signs and symptoms of bleeding such as blood in your spit, if your stools turn black, tarry, or bloody    - If you have not urinated within 8 hours following your procedure.- If your IV site becomes painful, red, inflamed, or looks infected.

## 2024-04-16 NOTE — ANESTHESIA POSTPROCEDURE EVALUATION
"Patient: Kallie Watkins \"Keegan\"    Procedure Summary       Date: 04/16/24 Room / Location: [facility]    Anesthesia Start: 0928 Anesthesia Stop: 1014    Procedure: ENDOSCOPIC ULTRASOUND (UPPER) Diagnosis: Pancreatic cyst ([facility])    Scheduled Providers: Alexia Hernandez MD; Dona Islas MD; Rima Taylor RN Responsible Provider: Dona Islas MD    Anesthesia Type: MAC ASA Status: 3            Anesthesia Type: MAC    Vitals Value Taken Time   /72 04/16/24 1015   Temp 36.3 °C (97.3 °F) 04/16/24 1015   Pulse 64 04/16/24 1015   Resp 18 04/16/24 1015   SpO2 96 % 04/16/24 1015       Anesthesia Post Evaluation    Patient location during evaluation: PACU  Patient participation: complete - patient participated  Level of consciousness: awake  Pain management: adequate  Airway patency: patent  Cardiovascular status: acceptable  Respiratory status: acceptable  Hydration status: acceptable  Postoperative Nausea and Vomiting: none        No notable events documented.    "

## 2024-04-16 NOTE — ANESTHESIA PREPROCEDURE EVALUATION
"Patient: Kallie Watkins \"NannetteeLynn\"    Procedure Information       Date/Time: 04/16/24 0930    Scheduled providers: Alexia Hernandez MD; Dona Islas MD    Procedure: ENDOSCOPIC ULTRASOUND (UPPER)    Location: [facility]        ALLERGIES:  No Known Allergies     MEDICAL HISTORY:  Past Medical History:   Diagnosis Date    Arthritis     Fibromyalgia     HTN (hypertension)     Mitral valve prolapse     Narcolepsy ([facility])     Pancreatic lesion ([facility])       Recently has seen pulmonology for shortness of breath and started using an inhaler. Pt reports symptoms improved and no shortness of breath at this time. No recent respiratory infections.  Relevant Problems   No relevant active problems        SURGICAL HISTORY:  No past surgical history on file.     VITALS:      3/27/2024     9:04 AM 3/27/2024     3:27 AM 3/26/2024     7:52 PM   Vitals   Systolic 141 111 115   Diastolic 80 61 62   Heart Rate 71 69 81   Temp 37.1 °C (98.8 °F) 37.2 °C (99 °F) 37.1 °C (98.8 °F)   Resp 16 16 16       LABS:   BMP   Lab Results   Component Value Date    GLUCOSE 108 (H) 03/27/2024    CALCIUM 10.0 03/27/2024     03/27/2024    K 4.1 03/27/2024    CO2 26 03/27/2024     03/27/2024    BUN 16 03/27/2024    CREATININE 0.89 03/27/2024   , LFT   Lab Results   Component Value Date     (H) 03/27/2024    AST 54 (H) 03/27/2024    ALKPHOS 186 (H) 03/27/2024    BILITOT 0.6 03/27/2024   , CBC  Lab Results   Component Value Date    WBC 6.5 03/27/2024    HGB 12.2 03/27/2024    HCT 36.7 03/27/2024    MCV 82 03/27/2024     03/27/2024     , A1C   Lab Results   Component Value Date    HGBA1C 5.2 03/26/2024       IMAGES:  EKG        No results found for this or any previous visit (from the past 4464 hour(s)).   , ECHO       No results found for this or any previous visit from the past 730 days.    , CARDIAC CATH      No results found for this or any previous visit from the past 730 days.   , CXR     No " results found for this or any previous visit from the past 730 days.    , CT Head/Neck     No results found for this or any previous visit from the past 760 days.    , CT Chest      No results found for this or any previous visit from the past 730 days.   , CT Abdomin     No results found for this or any previous visit from the past 730 days.    SOCIAL:  Social History     Tobacco Use   Smoking Status Former    Types: Cigarettes   Smokeless Tobacco Never      Social History     Substance and Sexual Activity   Alcohol Use Never      Social History     Substance and Sexual Activity   Drug Use Never        NPO STATUS:  No data recorded    Clinical Areas Reviewed:                 Physical Exam    Airway  Mallampati: II  TM distance: >3 FB  Neck ROM: full     Cardiovascular - normal exam     Dental - normal exam     Pulmonary - normal exam     Abdominal - normal exam             Anesthesia Plan    History of general anesthesia?: yes  History of complications of general anesthesia?: no    ASA 3     MAC     intravenous induction   Postoperative administration of opioids is intended.  Anesthetic plan and risks discussed with patient.  Use of blood products discussed with patient who.    Plan discussed with CAA and attending.

## 2024-04-23 LAB
LABORATORY COMMENT REPORT: NORMAL
PATH REPORT.FINAL DX SPEC: NORMAL
PATH REPORT.GROSS SPEC: NORMAL
PATH REPORT.TOTAL CANCER: NORMAL

## 2025-04-08 ENCOUNTER — AMBULATORY SURGICAL CENTER (OUTPATIENT)
Dept: URBAN - METROPOLITAN AREA SURGERY 6 | Facility: SURGERY | Age: 75
End: 2025-04-08
Payer: COMMERCIAL

## 2025-04-08 ENCOUNTER — OFFICE (OUTPATIENT)
Dept: URBAN - METROPOLITAN AREA PATHOLOGY 6 | Facility: PATHOLOGY | Age: 75
End: 2025-04-08
Payer: COMMERCIAL

## 2025-04-08 VITALS
OXYGEN SATURATION: 90 % | SYSTOLIC BLOOD PRESSURE: 116 MMHG | OXYGEN SATURATION: 90 % | DIASTOLIC BLOOD PRESSURE: 52 MMHG | SYSTOLIC BLOOD PRESSURE: 136 MMHG | WEIGHT: 119 LBS | OXYGEN SATURATION: 90 % | DIASTOLIC BLOOD PRESSURE: 76 MMHG | DIASTOLIC BLOOD PRESSURE: 70 MMHG | RESPIRATION RATE: 16 BRPM | DIASTOLIC BLOOD PRESSURE: 79 MMHG | TEMPERATURE: 97.2 F | HEART RATE: 65 BPM | HEART RATE: 66 BPM | RESPIRATION RATE: 16 BRPM | HEART RATE: 59 BPM | OXYGEN SATURATION: 98 % | HEIGHT: 62 IN | DIASTOLIC BLOOD PRESSURE: 79 MMHG | RESPIRATION RATE: 18 BRPM | DIASTOLIC BLOOD PRESSURE: 52 MMHG | RESPIRATION RATE: 16 BRPM | DIASTOLIC BLOOD PRESSURE: 70 MMHG | RESPIRATION RATE: 18 BRPM | OXYGEN SATURATION: 97 % | WEIGHT: 119 LBS | HEART RATE: 66 BPM | SYSTOLIC BLOOD PRESSURE: 144 MMHG | SYSTOLIC BLOOD PRESSURE: 136 MMHG | TEMPERATURE: 97.2 F | HEIGHT: 62 IN | SYSTOLIC BLOOD PRESSURE: 116 MMHG | TEMPERATURE: 97.2 F | HEART RATE: 78 BPM | HEART RATE: 66 BPM | SYSTOLIC BLOOD PRESSURE: 119 MMHG | DIASTOLIC BLOOD PRESSURE: 70 MMHG | RESPIRATION RATE: 18 BRPM | SYSTOLIC BLOOD PRESSURE: 116 MMHG | OXYGEN SATURATION: 95 % | SYSTOLIC BLOOD PRESSURE: 136 MMHG | HEART RATE: 78 BPM | SYSTOLIC BLOOD PRESSURE: 144 MMHG | RESPIRATION RATE: 9 BRPM | OXYGEN SATURATION: 95 % | OXYGEN SATURATION: 98 % | HEIGHT: 62 IN | SYSTOLIC BLOOD PRESSURE: 119 MMHG | HEART RATE: 78 BPM | DIASTOLIC BLOOD PRESSURE: 79 MMHG | RESPIRATION RATE: 9 BRPM | OXYGEN SATURATION: 97 % | SYSTOLIC BLOOD PRESSURE: 119 MMHG | WEIGHT: 119 LBS | SYSTOLIC BLOOD PRESSURE: 144 MMHG | DIASTOLIC BLOOD PRESSURE: 76 MMHG | HEART RATE: 59 BPM | HEART RATE: 59 BPM | DIASTOLIC BLOOD PRESSURE: 52 MMHG | RESPIRATION RATE: 9 BRPM | OXYGEN SATURATION: 97 % | HEART RATE: 65 BPM | OXYGEN SATURATION: 98 % | DIASTOLIC BLOOD PRESSURE: 76 MMHG | OXYGEN SATURATION: 95 % | HEART RATE: 65 BPM

## 2025-04-08 DIAGNOSIS — K22.70 BARRETT'S ESOPHAGUS WITHOUT DYSPLASIA: ICD-10-CM

## 2025-04-08 DIAGNOSIS — K44.9 DIAPHRAGMATIC HERNIA WITHOUT OBSTRUCTION OR GANGRENE: ICD-10-CM

## 2025-04-08 DIAGNOSIS — Z87.19 PERSONAL HISTORY OF OTHER DISEASES OF THE DIGESTIVE SYSTEM: ICD-10-CM

## 2025-04-08 DIAGNOSIS — K31.89 OTHER DISEASES OF STOMACH AND DUODENUM: ICD-10-CM

## 2025-04-08 DIAGNOSIS — K20.80 OTHER ESOPHAGITIS WITHOUT BLEEDING: ICD-10-CM

## 2025-04-08 PROCEDURE — 88305 TISSUE EXAM BY PATHOLOGIST: CPT | Performed by: PATHOLOGY

## 2025-04-08 PROCEDURE — 43239 EGD BIOPSY SINGLE/MULTIPLE: CPT | Performed by: INTERNAL MEDICINE

## 2025-04-08 PROCEDURE — 88313 SPECIAL STAINS GROUP 2: CPT | Performed by: PATHOLOGY

## 2025-06-11 ENCOUNTER — OFFICE (OUTPATIENT)
Dept: URBAN - METROPOLITAN AREA CLINIC 15 | Facility: CLINIC | Age: 75
End: 2025-06-11
Payer: COMMERCIAL

## 2025-06-11 VITALS
OXYGEN SATURATION: 97 % | HEIGHT: 62 IN | WEIGHT: 119 LBS | SYSTOLIC BLOOD PRESSURE: 132 MMHG | DIASTOLIC BLOOD PRESSURE: 60 MMHG | TEMPERATURE: 98.4 F | HEART RATE: 61 BPM

## 2025-06-11 DIAGNOSIS — Z86.0100 PERSONAL HISTORY OF COLON POLYPS, UNSPECIFIED: ICD-10-CM

## 2025-06-11 DIAGNOSIS — K58.9 IRRITABLE BOWEL SYNDROME, UNSPECIFIED: ICD-10-CM

## 2025-06-11 DIAGNOSIS — K21.00 GASTRO-ESOPHAGEAL REFLUX DISEASE WITH ESOPHAGITIS, WITHOUT B: ICD-10-CM

## 2025-06-11 DIAGNOSIS — R14.0 ABDOMINAL DISTENSION (GASEOUS): ICD-10-CM

## 2025-06-11 PROCEDURE — 99214 OFFICE O/P EST MOD 30 MIN: CPT | Performed by: NURSE PRACTITIONER

## 2025-06-11 RX ORDER — PANTOPRAZOLE 20 MG/1
TABLET, DELAYED RELEASE ORAL
Qty: 60 | Refills: 11 | Status: ACTIVE
Start: 2025-06-11